# Patient Record
Sex: FEMALE
[De-identification: names, ages, dates, MRNs, and addresses within clinical notes are randomized per-mention and may not be internally consistent; named-entity substitution may affect disease eponyms.]

---

## 2017-06-17 ENCOUNTER — HEALTH MAINTENANCE LETTER (OUTPATIENT)
Age: 52
End: 2017-06-17

## 2021-05-24 ENCOUNTER — RECORDS - HEALTHEAST (OUTPATIENT)
Dept: ADMINISTRATIVE | Facility: CLINIC | Age: 56
End: 2021-05-24

## 2021-05-25 ENCOUNTER — RECORDS - HEALTHEAST (OUTPATIENT)
Dept: ADMINISTRATIVE | Facility: CLINIC | Age: 56
End: 2021-05-25

## 2021-05-27 ENCOUNTER — RECORDS - HEALTHEAST (OUTPATIENT)
Dept: ADMINISTRATIVE | Facility: CLINIC | Age: 56
End: 2021-05-27

## 2021-05-28 ENCOUNTER — RECORDS - HEALTHEAST (OUTPATIENT)
Dept: ADMINISTRATIVE | Facility: CLINIC | Age: 56
End: 2021-05-28

## 2021-05-29 ENCOUNTER — RECORDS - HEALTHEAST (OUTPATIENT)
Dept: ADMINISTRATIVE | Facility: CLINIC | Age: 56
End: 2021-05-29

## 2021-06-01 ENCOUNTER — RECORDS - HEALTHEAST (OUTPATIENT)
Dept: ADMINISTRATIVE | Facility: CLINIC | Age: 56
End: 2021-06-01

## 2023-04-06 ENCOUNTER — TRANSFERRED RECORDS (OUTPATIENT)
Dept: HEALTH INFORMATION MANAGEMENT | Facility: CLINIC | Age: 58
End: 2023-04-06

## 2024-02-29 ENCOUNTER — TRANSFERRED RECORDS (OUTPATIENT)
Dept: HEALTH INFORMATION MANAGEMENT | Facility: CLINIC | Age: 59
End: 2024-02-29

## 2024-03-04 ENCOUNTER — TRANSFERRED RECORDS (OUTPATIENT)
Dept: HEALTH INFORMATION MANAGEMENT | Facility: CLINIC | Age: 59
End: 2024-03-04
Payer: COMMERCIAL

## 2024-03-04 LAB
ALT SERPL-CCNC: 15 U/L (ref 6–29)
AST SERPL-CCNC: 11 U/L (ref 10–35)
CHOLESTEROL (EXTERNAL): 242 MG/DL
CREATININE (EXTERNAL): 0.61 MG/DL (ref 0.5–1.03)
GFR ESTIMATED (EXTERNAL): 103 ML/MIN/1.73M2
GLUCOSE (EXTERNAL): 128 MG/DL (ref 65–99)
HBA1C MFR BLD: 7.7 %
HDLC SERPL-MCNC: 57 MG/DL
LDL CHOLESTEROL CALCULATED (EXTERNAL): 149 MG/DL
NON HDL CHOLESTEROL (EXTERNAL): 185 MG/DL
POTASSIUM (EXTERNAL): 4 MMOL/L (ref 3.5–5.3)
TRIGLYCERIDES (EXTERNAL): 222 MG/DL
TSH SERPL-ACNC: 2.2 MIU/L (ref 0.4–4.5)

## 2024-03-06 ENCOUNTER — TRANSFERRED RECORDS (OUTPATIENT)
Dept: HEALTH INFORMATION MANAGEMENT | Facility: CLINIC | Age: 59
End: 2024-03-06
Payer: COMMERCIAL

## 2024-03-07 ENCOUNTER — TRANSCRIBE ORDERS (OUTPATIENT)
Dept: OTHER | Age: 59
End: 2024-03-07

## 2024-03-07 DIAGNOSIS — R07.89 CHEST DISCOMFORT: Primary | ICD-10-CM

## 2024-03-08 ENCOUNTER — APPOINTMENT (OUTPATIENT)
Dept: INTERPRETER SERVICES | Facility: CLINIC | Age: 59
End: 2024-03-08
Payer: COMMERCIAL

## 2024-03-08 ENCOUNTER — TRANSFERRED RECORDS (OUTPATIENT)
Dept: HEALTH INFORMATION MANAGEMENT | Facility: CLINIC | Age: 59
End: 2024-03-08
Payer: COMMERCIAL

## 2024-04-03 ENCOUNTER — OFFICE VISIT (OUTPATIENT)
Dept: CARDIOLOGY | Facility: CLINIC | Age: 59
End: 2024-04-03
Payer: COMMERCIAL

## 2024-04-03 VITALS
RESPIRATION RATE: 14 BRPM | BODY MASS INDEX: 33.08 KG/M2 | DIASTOLIC BLOOD PRESSURE: 82 MMHG | HEART RATE: 68 BPM | SYSTOLIC BLOOD PRESSURE: 122 MMHG | WEIGHT: 161 LBS

## 2024-04-03 DIAGNOSIS — E78.5 DYSLIPIDEMIA: ICD-10-CM

## 2024-04-03 DIAGNOSIS — R07.89 CHEST DISCOMFORT: Primary | ICD-10-CM

## 2024-04-03 DIAGNOSIS — R00.2 PALPITATIONS: ICD-10-CM

## 2024-04-03 DIAGNOSIS — E11.9 TYPE 2 DIABETES MELLITUS WITHOUT COMPLICATION, WITHOUT LONG-TERM CURRENT USE OF INSULIN (H): ICD-10-CM

## 2024-04-03 LAB
ATRIAL RATE - MUSE: 67 BPM
DIASTOLIC BLOOD PRESSURE - MUSE: NORMAL MMHG
INTERPRETATION ECG - MUSE: NORMAL
P AXIS - MUSE: 53 DEGREES
PR INTERVAL - MUSE: 166 MS
QRS DURATION - MUSE: 82 MS
QT - MUSE: 422 MS
QTC - MUSE: 445 MS
R AXIS - MUSE: 10 DEGREES
SYSTOLIC BLOOD PRESSURE - MUSE: NORMAL MMHG
T AXIS - MUSE: 35 DEGREES
VENTRICULAR RATE- MUSE: 67 BPM

## 2024-04-03 PROCEDURE — 93000 ELECTROCARDIOGRAM COMPLETE: CPT | Performed by: INTERNAL MEDICINE

## 2024-04-03 PROCEDURE — 93242 EXT ECG>48HR<7D RECORDING: CPT | Performed by: INTERNAL MEDICINE

## 2024-04-03 PROCEDURE — 99204 OFFICE O/P NEW MOD 45 MIN: CPT | Performed by: INTERNAL MEDICINE

## 2024-04-03 RX ORDER — METFORMIN HCL 500 MG
500 TABLET, EXTENDED RELEASE 24 HR ORAL 2 TIMES DAILY WITH MEALS
COMMUNITY
Start: 2024-03-01

## 2024-04-03 NOTE — LETTER
4/3/2024    Dakota Fernández MD  [address]  [facility] [address]    RE: Miguel Fernández       Dear Colleague,     I had the pleasure of seeing [facility] in the [facility].      Click to link to [facility] HEART CARE NOTE    Thank you, Dr. Fernández, for asking me to see Miguel Fernández in consultation at [facility] to evaluate chest discomfort and palpitations.      Assessment/Plan:   Chest discomfort: The patient developed intermittent chest discomfort over last 2 months.  It is associated with shortness of breath on exertion.  ECG showed sinus arrhythmia with no ischemic or ST-T change.  We discussed the further evaluation and management.  She has several risk of developing significant coronary artery disease including type 2 diabetes, dyslipidemia, her age.  After discussion, coronary CT angiogram is requested for further evaluation.  Palpitations: The patient complains of intermittent palpitations over last 1 to 2 months.  She described her palpitations as fluttering heartbeats, sometimes rapid heartbeats.  It could be related to premature heartbeats such as PVCs, short episodes of atrial fibrillation or paroxysmal supraventricular tachycardia.  After discussion, 3 days of Zio patch monitor is requested.  Dyslipidemia: Continue Zocor 40 mg at bedtime.  Her recent LDL on March 4, 2024 was 149.  She has type 2 diabetes.  LDL is not controlled.  After coronary CT angiogram exam, modify her statins at next visit.  Type 2 diabetes: Continue metformin.    We will follow-up her Zio patch monitor, coronary CT angiogram reports, discuss the findings with the patient.    The patient does not speak English.  Her medical history is interpreted by a professional [facility] .      Thank you for the opportunity to be involved in the care of Miguel Fernández. If you have any questions, please feel free to contact me.  I will see the patient again in 2 months and  as needed    Much or all of the text in this note was generated through the use of Dragon Dictate voice-to-text software. Errors in spelling or words which seem out of context are unintentional.   Sound alike errors, in particular, may have escaped editing.       History of Present Illness:   It is my pleasure to see Miguel Fernández at the [facility] clinic for evaluation of chest discomfort and palpitations. Miguel Fernández is a 58 year old female with a medical history of type 2 diabetes, dyslipidemia, anxiety, hepatitis B viral carrier.    The patient developed intermittent chest discomfort and palpitations over last 2 months.  She described her chest discomfort as located anterior chest, mild in severity, intermittent, lasted variable duration, no radiation, not typically associated with exertion.  She also complains of shortness of breath on exertion at a similar time.  She complains of intermittent fluttering heartbeats which also make her shortness of breath.  She denies lightheadedness, dizziness, orthopnea, PND or leg edema.  Her weight has been stable.  Her blood pressure and heart rate are in normal range today.    Twelve-lead ECG in clinic today showed normal sinus rhythm, no ischemic or ST-T change.    Past Medical History:     Patient Active Problem List   Diagnosis    Immunology Studies Nonspecific Abnormal Findings    Anxiety    Myalgia And Myositis    Chronic Pain Syndrome    Arthralgias In Multiple Sites    Lower Back Pain    Cough    Major Depression, Single Episode    Hyperlipidemia    Tingling (Paresthesia)    Abdominal Pain    Hepatitis, B Virus [place]    Headache       Past Surgical History:   No past surgical history on file.    Family History:   Reviewed: Denies family history of coronary artery disease or CHF, sudden cardiac death.    Social History:    reports that she has never smoked. She does not have any smokeless tobacco history on file. She reports that she does not drink  alcohol and does not use drugs.    Review of Systems:   12 systems are reviewed negative except for in HPI.    Meds:     Current Outpatient Medications:     acetaminophen-codeine (TYLENOL #3) 300-30 mg per tablet, [ACETAMINOPHEN-CODEINE (TYLENOL #3) 300-30 MG PER TABLET] Take 1-2 tablets by mouth every 6 (six) hours as needed for pain., Disp: , Rfl:     ALPRAZolam (XANAX) 0.5 MG tablet, [ALPRAZOLAM (XANAX) 0.5 MG TABLET] Take 0.5 mg by mouth every 8 (eight) hours as needed (for anxiety attacks)., Disp: , Rfl:     buPROPion (WELLBUTRIN XL) 300 MG 24 hr tablet, [BUPROPION (WELLBUTRIN XL) 300 MG 24 HR TABLET] , Disp: , Rfl:     CALCIUM 600 + D,3, 600 mg(1,500mg) -200 unit per tablet, [CALCIUM 600 + D,3, 600 MG(1,500MG) -200 UNIT PER TABLET] TAKE 1 PILL BY MOUTH EVERY DAY/[facility] NOJ 1 LUB TSHUAJ PAB POBTXHA, Disp: 30 tablet, Rfl: 10    capsaicin (TRIXAICIN) 0.025 % cream, [CAPSAICIN (TRIXAICIN) 0.025 % CREAM] , Disp: , Rfl:     capsicum (TRIXAICIN HP) 0.075 % topical cream, [CAPSICUM (TRIXAICIN HP) 0.075 % TOPICAL CREAM] , Disp: , Rfl:     cholecalciferol, vitamin D3, (VITAMIN D3) 1,000 unit capsule, [CHOLECALCIFEROL, VITAMIN D3, (VITAMIN D3) 1,000 UNIT CAPSULE] Take 1,000 Units by mouth daily., Disp: , Rfl:     cyclobenzaprine (FLEXERIL) 10 MG tablet, [CYCLOBENZAPRINE (FLEXERIL) 10 MG TABLET] Take 10 mg by mouth 3 (three) times a day as needed (for muscle tension, tightness)., Disp: , Rfl:     DULoxetine (CYMBALTA) 60 MG capsule, [DULOXETINE (CYMBALTA) 60 MG CAPSULE] Take 60 mg by mouth daily., Disp: , Rfl:     lansoprazole (PREVACID) 30 MG capsule, [LANSOPRAZOLE (PREVACID) 30 MG CAPSULE] TAKE 1 PILL BY MOUTH EVERY DAY/ TXHUA HNUB NOJ 1 LUB TSHUAJ PAB CRYSTAL LUB PLAB, Disp: 30 capsule, Rfl: 5    lidocaine (XYLOCAINE) 5 % ointment, [LIDOCAINE (XYLOCAINE) 5 % OINTMENT] , Disp: , Rfl:     loperamide (IMODIUM A-D) 2 mg tablet, [LOPERAMIDE (IMODIUM A-D) 2 MG TABLET] Take 4 mg by mouth bedtime., Disp: , Rfl:     loratadine  (CLARITIN) 10 mg tablet, [LORATADINE (CLARITIN) 10 MG TABLET] Take 10 mg by mouth daily., Disp: , Rfl:     meclizine (ANTIVERT) 25 mg tablet, [MECLIZINE (ANTIVERT) 25 MG TABLET] Take 25 mg by mouth 4 (four) times a day., Disp: , Rfl:     metFORMIN (GLUCOPHAGE XR) 500 MG 24 hr tablet, Take 500 mg by mouth 2 times daily (with meals), Disp: , Rfl:     simvastatin (ZOCOR) 40 MG tablet, [SIMVASTATIN (ZOCOR) 40 MG TABLET] TAKE 1 PILL BY MOUTH EVERY DAY/ TXHUA HNUB NOJ 1 LUB TSHUAJ PAB CRYSTAL NTSHAV MUAJ ROJ, Disp: 30 tablet, Rfl: 0    traZODone (DESYREL) 100 MG tablet, [TRAZODONE (DESYREL) 100 MG TABLET] Take 200 mg by mouth bedtime., Disp: , Rfl:     triamcinolone (KENALOG) 0.1 % paste, [TRIAMCINOLONE (KENALOG) 0.1 % PASTE] Apply to teeth 2 (two) times a day., Disp: , Rfl:     trolamine salicylate (ARTHRICREAM) 10 % cream, [TROLAMINE SALICYLATE (ARTHRICREAM) 10 % CREAM] , Disp: , Rfl:      Allergies:   Patient has no known allergies.    Objective:      Physical Exam  73 kg (161 lb)     Body mass index is 33.08 kg/m .  /82 (BP Location: Left arm, Patient Position: Sitting, Cuff Size: Adult Regular)   Pulse 68   Resp 14   Wt 73 kg (161 lb)   BMI 33.08 kg/m      General Appearance:   Awake, Alert, No acute distress.   HEENT:  Pupil equal, reactive to light. No scleral icterus; the mucous membranes were moist. No oral ulcers or thrush.    Neck: No cervical bruits. No JVD. No thyromegaly. No lymph node enlargement or tenderness.   Chest: The spine was straight. The chest was symmetric.   Lungs:   Respirations unlabored. Lungs are clear to auscultation. No crackles. No wheezing.   Cardiovascular:   RRR, normal first and second heart sounds with no murmurs. No rubs or gallops.    Abdomen:  Soft. No tenderness. Non-distended. Bowels sounds are present   Extremities: Equal posterior tibial pulses. No leg edema.   Skin: No rashes or ulcers. Warm, Dry.   Musculoskeletal: No tenderness. No deformity.   Neurologic: Mood and  affect are appropriate. No focal deficits.         EKG:  Personally reivewed  Normal sinus rhythm  Normal ECG  No previous ECG for comparison    Cardiac Imaging Studies       Lab Review   All her laboratory test on March 4, 2024 are reviewed:  CBC and BMP are normal except for blood sugar slightly high 128.   even she is on Zocor 40 mg at bedtime              Thank you for allowing me to participate in the care of your patient.      Sincerely,     Yaya Pinon MD     [facility]  cc:   Dakota Fernández MD  [facility] [place] [facility] [address] [place] [address]

## 2024-04-03 NOTE — PROGRESS NOTES
Click to link to [facility] HEART CARE NOTE    Thank you, Dr. Fernández, for asking me to see Miguel Fernández in consultation at [facility] to evaluate chest discomfort and palpitations.      Assessment/Plan:   Chest discomfort: The patient developed intermittent chest discomfort over last 2 months.  It is associated with shortness of breath on exertion.  ECG showed sinus arrhythmia with no ischemic or ST-T change.  We discussed the further evaluation and management.  She has several risk of developing significant coronary artery disease including type 2 diabetes, dyslipidemia, her age.  After discussion, coronary CT angiogram is requested for further evaluation.  Palpitations: The patient complains of intermittent palpitations over last 1 to 2 months.  She described her palpitations as fluttering heartbeats, sometimes rapid heartbeats.  It could be related to premature heartbeats such as PVCs, short episodes of atrial fibrillation or paroxysmal supraventricular tachycardia.  After discussion, 3 days of Zio patch monitor is requested.  Dyslipidemia: Continue Zocor 40 mg at bedtime.  Her recent LDL on March 4, 2024 was 149.  She has type 2 diabetes.  LDL is not controlled.  After coronary CT angiogram exam, modify her statins at next visit.  Type 2 diabetes: Continue metformin.    We will follow-up her Zio patch monitor, coronary CT angiogram reports, discuss the findings with the patient.    The patient does not speak English.  Her medical history is interpreted by a professional [facility] .      Thank you for the opportunity to be involved in the care of Miguel Fernández. If you have any questions, please feel free to contact me.  I will see the patient again in 2 months and as needed    Much or all of the text in this note was generated through the use of Dragon Dictate voice-to-text software. Errors in spelling or words which seem out of context are unintentional.   Sound  alike errors, in particular, may have escaped editing.       History of Present Illness:   It is my pleasure to see Miguel Fernández at the [facility] clinic for evaluation of chest discomfort and palpitations. Miguel Fernández is a 58 year old female with a medical history of type 2 diabetes, dyslipidemia, anxiety, hepatitis B viral carrier.    The patient developed intermittent chest discomfort and palpitations over last 2 months.  She described her chest discomfort as located anterior chest, mild in severity, intermittent, lasted variable duration, no radiation, not typically associated with exertion.  She also complains of shortness of breath on exertion at a similar time.  She complains of intermittent fluttering heartbeats which also make her shortness of breath.  She denies lightheadedness, dizziness, orthopnea, PND or leg edema.  Her weight has been stable.  Her blood pressure and heart rate are in normal range today.    Twelve-lead ECG in clinic today showed normal sinus rhythm, no ischemic or ST-T change.    Past Medical History:     Patient Active Problem List   Diagnosis    Immunology Studies Nonspecific Abnormal Findings    Anxiety    Myalgia And Myositis    Chronic Pain Syndrome    Arthralgias In Multiple Sites    Lower Back Pain    Cough    Major Depression, Single Episode    Hyperlipidemia    Tingling (Paresthesia)    Abdominal Pain    Hepatitis, B Virus [place]    Headache       Past Surgical History:   No past surgical history on file.    Family History:   Reviewed: Denies family history of coronary artery disease or CHF, sudden cardiac death.    Social History:    reports that she has never smoked. She does not have any smokeless tobacco history on file. She reports that she does not drink alcohol and does not use drugs.    Review of Systems:   12 systems are reviewed negative except for in HPI.    Meds:     Current Outpatient Medications:     acetaminophen-codeine (TYLENOL #3) 300-30 mg  per tablet, [ACETAMINOPHEN-CODEINE (TYLENOL #3) 300-30 MG PER TABLET] Take 1-2 tablets by mouth every 6 (six) hours as needed for pain., Disp: , Rfl:     ALPRAZolam (XANAX) 0.5 MG tablet, [ALPRAZOLAM (XANAX) 0.5 MG TABLET] Take 0.5 mg by mouth every 8 (eight) hours as needed (for anxiety attacks)., Disp: , Rfl:     buPROPion (WELLBUTRIN XL) 300 MG 24 hr tablet, [BUPROPION (WELLBUTRIN XL) 300 MG 24 HR TABLET] , Disp: , Rfl:     CALCIUM 600 + D,3, 600 mg(1,500mg) -200 unit per tablet, [CALCIUM 600 + D,3, 600 MG(1,500MG) -200 UNIT PER TABLET] TAKE 1 PILL BY MOUTH EVERY DAY/[facility] NO 1 LUB [facility] POBTXHA, Disp: 30 tablet, Rfl: 10    capsaicin (TRIXAICIN) 0.025 % cream, [CAPSAICIN (TRIXAICIN) 0.025 % CREAM] , Disp: , Rfl:     capsicum (TRIXAICIN HP) 0.075 % topical cream, [CAPSICUM (TRIXAICIN HP) 0.075 % TOPICAL CREAM] , Disp: , Rfl:     cholecalciferol, vitamin D3, (VITAMIN D3) 1,000 unit capsule, [CHOLECALCIFEROL, VITAMIN D3, (VITAMIN D3) 1,000 UNIT CAPSULE] Take 1,000 Units by mouth daily., Disp: , Rfl:     cyclobenzaprine (FLEXERIL) 10 MG tablet, [CYCLOBENZAPRINE (FLEXERIL) 10 MG TABLET] Take 10 mg by mouth 3 (three) times a day as needed (for muscle tension, tightness)., Disp: , Rfl:     DULoxetine (CYMBALTA) 60 MG capsule, [DULOXETINE (CYMBALTA) 60 MG CAPSULE] Take 60 mg by mouth daily., Disp: , Rfl:     lansoprazole (PREVACID) 30 MG capsule, [LANSOPRAZOLE (PREVACID) 30 MG CAPSULE] TAKE 1 P[facility] BY MOUTH EVERY [facility] LUB [facility] PAB CRYSTAL LUB PLAB, Disp: 30 capsule, Rfl: 5    lidocaine (XYLOCAINE) 5 % ointment, [LIDOCAINE (XYLOCAINE) 5 % OINTMENT] , Disp: , Rfl:     loperamide (IMODIUM A-D) 2 mg tablet, [LOPERAMIDE (IMODIUM A-D) 2 MG TABLET] Take 4 mg by mouth bedtime., Disp: , Rfl:     loratadine (CLARITIN) 10 mg tablet, [LORATADINE (CLARITIN) 10 MG TABLET] Take 10 mg by mouth daily., Disp: , Rfl:     meclizine (ANTIVERT) 25 mg tablet, [MECLIZINE (ANTIVERT) 25 MG TABLET] Take 25 mg by mouth 4  (four) times a day., Disp: , Rfl:     metFORMIN (GLUCOPHAGE XR) 500 MG 24 hr tablet, Take 500 mg by mouth 2 times daily (with meals), Disp: , Rfl:     simvastatin (ZOCOR) 40 MG tablet, [SIMVASTATIN (ZOCOR) 40 MG TABLET] TAKE 1 PILL BY MOUTH EVERY DAY/ TXJUANYA HNUB NOJ 1 LUB TSHUAJ PAB CRYSTAL NTSHAV MUAJ ROJ, Disp: 30 tablet, Rfl: 0    traZODone (DESYREL) 100 MG tablet, [TRAZODONE (DESYREL) 100 MG TABLET] Take 200 mg by mouth bedtime., Disp: , Rfl:     triamcinolone (KENALOG) 0.1 % paste, [TRIAMCINOLONE (KENALOG) 0.1 % PASTE] Apply to teeth 2 (two) times a day., Disp: , Rfl:     trolamine salicylate (ARTHRICREAM) 10 % cream, [TROLAMINE SALICYLATE (ARTHRICREAM) 10 % CREAM] , Disp: , Rfl:      Allergies:   Patient has no known allergies.    Objective:      Physical Exam  73 kg (161 lb)     Body mass index is 33.08 kg/m .  /82 (BP Location: Left arm, Patient Position: Sitting, Cuff Size: Adult Regular)   Pulse 68   Resp 14   Wt 73 kg (161 lb)   BMI 33.08 kg/m      General Appearance:   Awake, Alert, No acute distress.   HEENT:  Pupil equal, reactive to light. No scleral icterus; the mucous membranes were moist. No oral ulcers or thrush.    Neck: No cervical bruits. No JVD. No thyromegaly. No lymph node enlargement or tenderness.   Chest: The spine was straight. The chest was symmetric.   Lungs:   Respirations unlabored. Lungs are clear to auscultation. No crackles. No wheezing.   Cardiovascular:   RRR, normal first and second heart sounds with no murmurs. No rubs or gallops.    Abdomen:  Soft. No tenderness. Non-distended. Bowels sounds are present   Extremities: Equal posterior tibial pulses. No leg edema.   Skin: No rashes or ulcers. Warm, Dry.   Musculoskeletal: No tenderness. No deformity.   Neurologic: Mood and affect are appropriate. No focal deficits.         EKG:  Personally reivewed  Normal sinus rhythm  Normal ECG  No previous ECG for comparison    Cardiac Imaging Studies       Lab Review   All her  laboratory test on March 4, 2024 are reviewed:  CBC and BMP are normal except for blood sugar slightly high 128.   even she is on Zocor 40 mg at bedtime

## 2024-04-03 NOTE — NURSING NOTE
Miguel Fernández arrived here on 4/3/2024 8:53 AM for 3-7 Days  Zio monitor placement per ordering provider Dr. Pinon for the diagnosis Palpitations [R00.2] .  Patient s skin was prepped per protocol.  Zio monitor was placed.  Instructions were reviewed with and given to the patient.  Patient verbalized understanding of wear, troubleshooting and monitor return instructions.

## 2024-04-17 PROCEDURE — 93244 EXT ECG>48HR<7D REV&INTERPJ: CPT | Performed by: INTERNAL MEDICINE

## 2024-05-01 ENCOUNTER — HOSPITAL ENCOUNTER (OUTPATIENT)
Dept: CT IMAGING | Facility: CLINIC | Age: 59
Discharge: HOME | End: 2024-05-01
Attending: INTERNAL MEDICINE | Admitting: INTERNAL MEDICINE
Payer: COMMERCIAL

## 2024-05-01 VITALS — SYSTOLIC BLOOD PRESSURE: 118 MMHG | HEART RATE: 67 BPM | DIASTOLIC BLOOD PRESSURE: 59 MMHG

## 2024-05-01 DIAGNOSIS — R07.89 CHEST DISCOMFORT: ICD-10-CM

## 2024-05-01 LAB
BSA FOR ECHO PROCEDURE: 0 M2
CREAT BLD-MCNC: 0.6 MG/DL (ref 0.6–1.1)
CV CALCIUM SCORE AGATSTON LM: 0
CV CALCIUM SCORING AGATSON LAD: 26
CV CALCIUM SCORING AGATSTON CX: 1
CV CALCIUM SCORING AGATSTON RCA: 1
CV CALCIUM SCORING AGATSTON TOTAL: 28
EGFRCR SERPLBLD CKD-EPI 2021: >60 ML/MIN/1.73M2

## 2024-05-01 PROCEDURE — 75574 CT ANGIO HRT W/3D IMAGE: CPT | Mod: 26 | Performed by: INTERNAL MEDICINE

## 2024-05-01 PROCEDURE — 82565 ASSAY OF CREATININE: CPT

## 2024-05-01 PROCEDURE — 75574 CT ANGIO HRT W/3D IMAGE: CPT

## 2024-05-01 PROCEDURE — 250N000013 HC RX MED GY IP 250 OP 250 PS 637: Performed by: INTERNAL MEDICINE

## 2024-05-01 PROCEDURE — 250N000011 HC RX IP 250 OP 636: Performed by: INTERNAL MEDICINE

## 2024-05-01 RX ORDER — IOPAMIDOL 755 MG/ML
100 INJECTION, SOLUTION INTRAVASCULAR ONCE
Status: COMPLETED | OUTPATIENT
Start: 2024-05-01 | End: 2024-05-01

## 2024-05-01 RX ORDER — DILTIAZEM HYDROCHLORIDE 5 MG/ML
10 INJECTION INTRAVENOUS
Status: DISCONTINUED | OUTPATIENT
Start: 2024-05-01 | End: 2024-05-02 | Stop reason: HOSPADM

## 2024-05-01 RX ORDER — NITROGLYCERIN 0.4 MG/1
0.4 TABLET SUBLINGUAL ONCE
Status: COMPLETED | OUTPATIENT
Start: 2024-05-01 | End: 2024-05-01

## 2024-05-01 RX ORDER — METOPROLOL TARTRATE 1 MG/ML
5 INJECTION, SOLUTION INTRAVENOUS
Status: DISCONTINUED | OUTPATIENT
Start: 2024-05-01 | End: 2024-05-02 | Stop reason: HOSPADM

## 2024-05-01 RX ORDER — DILTIAZEM HYDROCHLORIDE 5 MG/ML
5 INJECTION INTRAVENOUS
Status: DISCONTINUED | OUTPATIENT
Start: 2024-05-01 | End: 2024-05-02 | Stop reason: HOSPADM

## 2024-05-01 RX ADMIN — IOPAMIDOL 100 ML: 755 INJECTION, SOLUTION INTRAVENOUS at 08:13

## 2024-05-01 RX ADMIN — NITROGLYCERIN 0.4 MG: 0.4 TABLET SUBLINGUAL at 08:12

## 2024-06-03 ENCOUNTER — OFFICE VISIT (OUTPATIENT)
Dept: CARDIOLOGY | Facility: CLINIC | Age: 59
End: 2024-06-03
Attending: INTERNAL MEDICINE
Payer: COMMERCIAL

## 2024-06-03 VITALS
SYSTOLIC BLOOD PRESSURE: 122 MMHG | OXYGEN SATURATION: 96 % | WEIGHT: 157 LBS | RESPIRATION RATE: 16 BRPM | BODY MASS INDEX: 32.25 KG/M2 | HEART RATE: 68 BPM | DIASTOLIC BLOOD PRESSURE: 82 MMHG

## 2024-06-03 DIAGNOSIS — E78.5 DYSLIPIDEMIA, GOAL LDL BELOW 100: ICD-10-CM

## 2024-06-03 DIAGNOSIS — R07.89 CHEST DISCOMFORT: Primary | ICD-10-CM

## 2024-06-03 DIAGNOSIS — I25.10 ATHEROSCLEROSIS OF NATIVE CORONARY ARTERY OF NATIVE HEART WITHOUT ANGINA PECTORIS: ICD-10-CM

## 2024-06-03 DIAGNOSIS — R00.2 PALPITATIONS: ICD-10-CM

## 2024-06-03 DIAGNOSIS — E11.9 TYPE 2 DIABETES MELLITUS WITHOUT COMPLICATION, WITHOUT LONG-TERM CURRENT USE OF INSULIN (H): ICD-10-CM

## 2024-06-03 PROCEDURE — 99214 OFFICE O/P EST MOD 30 MIN: CPT | Performed by: INTERNAL MEDICINE

## 2024-06-03 RX ORDER — ROSUVASTATIN CALCIUM 40 MG/1
40 TABLET, COATED ORAL DAILY
Qty: 30 TABLET | Refills: 11 | Status: SHIPPED | OUTPATIENT
Start: 2024-06-03

## 2024-06-03 NOTE — PROGRESS NOTES
Click to link to [facility] HEART CARE NOTE       Assessment/Plan:   Coronary atherosclerosis: Her intermittent chest discomfort is not a cardiac etiology since her coronary CT angiogram was reported minimal coronary atherosclerosis.  Her symptoms could be related to anterior chest muscle strain.  We discussed the lifestyle modification including diet control, regular exercise and weight loss.  Discussed risk factor control including diabetes and dyslipidemia.   Palpitations: The patient's palpitations were correlated to sinus rhythm.  No cardiac arrhythmia.  Most likely she could have some of premature heartbeats.  No indication of medical treatment at this time.  Continue lifestyle modification and monitor.    Dyslipidemia:  Her LDL level was 149 even she was on simvastatin 40 mg at bedtime.  Simvastatin was discontinued, started rosuvastatin 40 mg at bedtime.  Repeat a lipid profile and liver function in 3 months.    Type 2 diabetes: Continue metformin.    The patient does not speak English.  Her medical history is interpreted by a professional [employer] .    Thank you for the opportunity to be involved in the care of Miguel Fernández. If you have any questions, please feel free to contact me.  I will see the patient again in 12 months and as needed    Much or all of the text in this note was generated through the use of Dragon Dictate voice-to-text software. Errors in spelling or words which seem out of context are unintentional.   Sound alike errors, in particular, may have escaped editing.       History of Present Illness:   It is my pleasure to see Miguel Fernández at the [facility] clinic for routine cardiology follow-up and discussing coronary CT angiogram and Zio patch monitor reports. Miguel Fernández is a 58 year old female with a medical history of type 2 diabetes, dyslipidemia, anxiety, hepatitis B viral carrier.    The patient was evaluated for  intermittent chest  discomfort and palpitations over last 2 months.  Since last visit, she still has some chest discomfort, not exertional.  She has occasional palpitations.  There is no significant interval change over last 2 months.  She had no chest pain.  She has no shortness of breath on exertion, no dizziness, orthopnea, PND or leg edema.  Her blood pressure and heart rate are in normal range.         Her coronary CT angiogram was reported total coronary calcium score 28, minimal coronary atherosclerosis in LAD, no obstructive lesion.  Her Zio patch monitor essentially was normal, symptoms of palpitations correlated to sinus rhythm.    Past Medical History:     Patient Active Problem List   Diagnosis    Immunology Studies Nonspecific Abnormal Findings    Anxiety    Myalgia And Myositis    Chronic Pain Syndrome    Arthralgias In Multiple Sites    Lower Back Pain    Cough    Major Depression, Single Episode    Dyslipidemia    Tingling (Paresthesia)    Abdominal Pain    Hepatitis, B Virus [place]    Headache    Type 2 diabetes mellitus without complication, without long-term current use of insulin (H)       Past Surgical History:   No past surgical history on file.    Family History:   Reviewed: Denies family history of coronary artery disease or CHF, sudden cardiac death.    Social History:    reports that she has never smoked. She does not have any smokeless tobacco history on file. She reports that she does not drink alcohol and does not use drugs.    Review of Systems:   12 systems are reviewed negative except for in HPI.    Meds:     Current Outpatient Medications:     acetaminophen-codeine (TYLENOL #3) 300-30 mg per tablet, [ACETAMINOPHEN-CODEINE (TYLENOL #3) 300-30 MG PER TABLET] Take 1-2 tablets by mouth every 6 (six) hours as needed for pain., Disp: , Rfl:     ALPRAZolam (XANAX) 0.5 MG tablet, [ALPRAZOLAM (XANAX) 0.5 MG TABLET] Take 0.5 mg by mouth every 8 (eight) hours as needed (for anxiety attacks)., Disp: , Rfl:      buPROPion (WELLBUTRIN XL) 300 MG 24 hr tablet, [BUPROPION (WELLBUTRIN XL) 300 MG 24 HR TABLET] , Disp: , Rfl:     CALCIUM 600 + D,3, 600 mg(1,500mg) -200 unit per tablet, [CALCIUM 600 + D,3, 600 MG(1,500MG) -200 UNIT PER TABLET] TAKE 1 PILL BY MOUTH EVERY DAY/[facility] POBTXHA, Disp: 30 tablet, Rfl: 10    capsaicin (TRIXAICIN) 0.025 % cream, [CAPSAICIN (TRIXAICIN) 0.025 % CREAM] , Disp: , Rfl:     capsicum (TRIXAICIN HP) 0.075 % topical cream, [CAPSICUM (TRIXAICIN HP) 0.075 % TOPICAL CREAM] , Disp: , Rfl:     cholecalciferol, vitamin D3, (VITAMIN D3) 1,000 unit capsule, [CHOLECALCIFEROL, VITAMIN D3, (VITAMIN D3) 1,000 UNIT CAPSULE] Take 1,000 Units by mouth daily., Disp: , Rfl:     cyclobenzaprine (FLEXERIL) 10 MG tablet, [CYCLOBENZAPRINE (FLEXERIL) 10 MG TABLET] Take 10 mg by mouth 3 (three) times a day as needed (for muscle tension, tightness)., Disp: , Rfl:     DULoxetine (CYMBALTA) 60 MG capsule, [DULOXETINE (CYMBALTA) 60 MG CAPSULE] Take 60 mg by mouth daily., Disp: , Rfl:     lansoprazole (PREVACID) 30 MG capsule, [LANSOPRAZOLE (PREVACID) 30 MG CAPSULE] TAKE 1 PILL BY MOUTH EVERY DAY/ [facility] PLAB, Disp: 30 capsule, Rfl: 5    lidocaine (XYLOCAINE) 5 % ointment, [LIDOCAINE (XYLOCAINE) 5 % OINTMENT] , Disp: , Rfl:     loperamide (IMODIUM A-D) 2 mg tablet, [LOPERAMIDE (IMODIUM A-D) 2 MG TABLET] Take 4 mg by mouth bedtime., Disp: , Rfl:     loratadine (CLARITIN) 10 mg tablet, [LORATADINE (CLARITIN) 10 MG TABLET] Take 10 mg by mouth daily., Disp: , Rfl:     meclizine (ANTIVERT) 25 mg tablet, [MECLIZINE (ANTIVERT) 25 MG TABLET] Take 25 mg by mouth 4 (four) times a day., Disp: , Rfl:     metFORMIN (GLUCOPHAGE XR) 500 MG 24 hr tablet, Take 500 mg by mouth 2 times daily (with meals), Disp: , Rfl:     rosuvastatin (CRESTOR) 40 MG tablet, Take 1 tablet (40 mg) by mouth daily, Disp: 30 tablet, Rfl: 11    traZODone (DESYREL) 100 MG tablet, [TRAZODONE (DESYREL) 100  MG TABLET] Take 200 mg by mouth bedtime., Disp: , Rfl:     triamcinolone (KENALOG) 0.1 % paste, [TRIAMCINOLONE (KENALOG) 0.1 % PASTE] Apply to teeth 2 (two) times a day., Disp: , Rfl:     trolamine salicylate (ARTHRICREAM) 10 % cream, [TROLAMINE SALICYLATE (ARTHRICREAM) 10 % CREAM] , Disp: , Rfl:      Allergies:   Patient has no known allergies.    Objective:      Physical Exam  71.2 kg (157 lb)     Body mass index is 32.25 kg/m .  /82 (BP Location: Right arm, Patient Position: Sitting, Cuff Size: Adult Regular)   Pulse 68   Resp 16   Wt 71.2 kg (157 lb)   SpO2 96%   BMI 32.25 kg/m      General Appearance:   Awake, Alert, No acute distress.   HEENT:  Pupil equal, reactive to light. No scleral icterus; the mucous membranes were moist. No oral ulcers or thrush.    Neck: No cervical bruits. No JVD. No thyromegaly. No lymph node enlargement or tenderness.   Chest: The spine was straight. The chest was symmetric.   Lungs:   Respirations unlabored. Lungs are clear to auscultation. No crackles. No wheezing.   Cardiovascular:   RRR, normal first and second heart sounds with no murmurs. No rubs or gallops.    Abdomen:  Soft. No tenderness. Non-distended. Bowels sounds are present   Extremities: Equal posterior tibial pulses. No leg edema.   Skin: No rashes or ulcers. Warm, Dry.   Musculoskeletal: No tenderness. No deformity.   Neurologic: Mood and affect are appropriate. No focal deficits.         EKG:  Personally reivewed  Normal sinus rhythm  Normal ECG  No previous ECG for comparison    Cardiac Imaging Studies  Coronary CT angiogram on May 4, 2024:    Calcium Scoring: The total Agatston score is 28. A calcium score in this range places the individual in the 76th percentile when compared to an age and gender matched control group and implies a moderate risk of cardiac events in the next ten years.    Minimal coronary atherosclerosis with no obstructive plaque identified.    [employer] patch monitor on April 17,  2024  Zio monitoring from 4/3/2024 to 4/8/2024 (duration 5d 12 h).  Predominant underlying rhythm was sinus rhythm, 58 to 142 bpm, average 84 bpm.  No nonsustained or sustained tachyarrhythmias.  No atrial fibrillation.  There were no pauses of greater than 3 seconds.  Rare supraventricular ectopic beats (<1%).  No premature ventricular contractions (0%).  Symptom triggers correlated with sinus rhythm.    Lab Review   All her laboratory test on March 4, 2024 are reviewed:  CBC and BMP are normal except for blood sugar slightly high 128.   even she is on Zocor 40 mg at bedtime

## 2024-06-03 NOTE — LETTER
6/3/2024    Dakota Fernández MD  [facility] [address] [place] [address]    RE: Miguel Fernández       Dear Colleague,     I had the pleasure of seeing Miguel Fernández in the [facility].      Click to link to [facility] HEART CARE NOTE       Assessment/Plan:   Coronary atherosclerosis: Her intermittent chest discomfort is not a cardiac etiology since her coronary CT angiogram was reported minimal coronary atherosclerosis.  Her symptoms could be related to anterior chest muscle strain.  We discussed the lifestyle modification including diet control, regular exercise and weight loss.  Discussed risk factor control including diabetes and dyslipidemia.   Palpitations: The patient's palpitations were correlated to sinus rhythm.  No cardiac arrhythmia.  Most likely she could have some of premature heartbeats.  No indication of medical treatment at this time.  Continue lifestyle modification and monitor.    Dyslipidemia:  Her LDL level was 149 even she was on simvastatin 40 mg at bedtime.  Simvastatin was discontinued, started rosuvastatin 40 mg at bedtime.  Repeat a lipid profile and liver function in 3 months.    Type 2 diabetes: Continue metformin.    The patient does not speak English.  Her medical history is interpreted by a professional [facility] .    Thank you for the opportunity to be involved in the care of Miguel Fernández. If you have any questions, please feel free to contact me.  I will see the patient again in 12 months and as needed    Much or all of the text in this note was generated through the use of Dragon Dictate voice-to-text software. Errors in spelling or words which seem out of context are unintentional.   Sound alike errors, in particular, may have escaped editing.       History of Present Illness:   It is my pleasure to see Miguel Fernández at the [facility] for routine cardiology follow-up and discussing coronary CT angiogram and [employer]  patch monitor reports. Miguel Fernández is a 58 year old female with a medical history of type 2 diabetes, dyslipidemia, anxiety, hepatitis B viral carrier.    The patient was evaluated for  intermittent chest discomfort and palpitations over last 2 months.  Since last visit, she still has some chest discomfort, not exertional.  She has occasional palpitations.  There is no significant interval change over last 2 months.  She had no chest pain.  She has no shortness of breath on exertion, no dizziness, orthopnea, PND or leg edema.  Her blood pressure and heart rate are in normal range.         Her coronary CT angiogram was reported total coronary calcium score 28, minimal coronary atherosclerosis in LAD, no obstructive lesion.  Her Zio patch monitor essentially was normal, symptoms of palpitations correlated to sinus rhythm.    Past Medical History:     Patient Active Problem List   Diagnosis    Immunology Studies Nonspecific Abnormal Findings    Anxiety    Myalgia And Myositis    Chronic Pain Syndrome    Arthralgias In Multiple Sites    Lower Back Pain    Cough    Major Depression, Single Episode    Dyslipidemia    Tingling (Paresthesia)    Abdominal Pain    Hepatitis, B Virus [place]    Headache    Type 2 diabetes mellitus without complication, without long-term current use of insulin (H)       Past Surgical History:   No past surgical history on file.    Family History:   Reviewed: Denies family history of coronary artery disease or CHF, sudden cardiac death.    Social History:    reports that she has never smoked. She does not have any smokeless tobacco history on file. She reports that she does not drink alcohol and does not use drugs.    Review of Systems:   12 systems are reviewed negative except for in HPI.    Meds:     Current Outpatient Medications:     acetaminophen-codeine (TYLENOL #3) 300-30 mg per tablet, [ACETAMINOPHEN-CODEINE (TYLENOL #3) 300-30 MG PER TABLET] Take 1-2 tablets by mouth every 6 (six)  hours as needed for pain., Disp: , Rfl:     ALPRAZolam (XANAX) 0.5 MG tablet, [ALPRAZOLAM (XANAX) 0.5 MG TABLET] Take 0.5 mg by mouth every 8 (eight) hours as needed (for anxiety attacks)., Disp: , Rfl:     buPROPion (WELLBUTRIN XL) 300 MG 24 hr tablet, [BUPROPION (WELLBUTRIN XL) 300 MG 24 HR TABLET] , Disp: , Rfl:     CALCIUM 600 + D,3, 600 mg(1,500mg) -200 unit per tablet, [CALCIUM 600 + D,3, 600 MG(1,500MG) -200 UNIT PER TABLET] TAKE 1 P[facility] BY MOUTH EVERY DAY[facility] POBTXHA, Disp: 30 tablet, Rfl: 10    capsaicin (TRIXAICIN) 0.025 % cream, [CAPSAICIN (TRIXAICIN) 0.025 % CREAM] , Disp: , Rfl:     capsicum (TRIXAICIN HP) 0.075 % topical cream, [CAPSICUM (TRIXAICIN HP) 0.075 % TOPICAL CREAM] , Disp: , Rfl:     cholecalciferol, vitamin D3, (VITAMIN D3) 1,000 unit capsule, [CHOLECALCIFEROL, VITAMIN D3, (VITAMIN D3) 1,000 UNIT CAPSULE] Take 1,000 Units by mouth daily., Disp: , Rfl:     cyclobenzaprine (FLEXERIL) 10 MG tablet, [CYCLOBENZAPRINE (FLEXERIL) 10 MG TABLET] Take 10 mg by mouth 3 (three) times a day as needed (for muscle tension, tightness)., Disp: , Rfl:     DULoxetine (CYMBALTA) 60 MG capsule, [DULOXETINE (CYMBALTA) 60 MG CAPSULE] Take 60 mg by mouth daily., Disp: , Rfl:     lansoprazole (PREVACID) 30 MG capsule, [LANSOPRAZOLE (PREVACID) 30 MG CAPSULE] TAKE 1 PILL BY MOUTH EVERY DAY/ [facility] LUB [facility] CRYSTAL LUB PLAB, Disp: 30 capsule, Rfl: 5    lidocaine (XYLOCAINE) 5 % ointment, [LIDOCAINE (XYLOCAINE) 5 % OINTMENT] , Disp: , Rfl:     loperamide (IMODIUM A-D) 2 mg tablet, [LOPERAMIDE (IMODIUM A-D) 2 MG TABLET] Take 4 mg by mouth bedtime., Disp: , Rfl:     loratadine (CLARITIN) 10 mg tablet, [LORATADINE (CLARITIN) 10 MG TABLET] Take 10 mg by mouth daily., Disp: , Rfl:     meclizine (ANTIVERT) 25 mg tablet, [MECLIZINE (ANTIVERT) 25 MG TABLET] Take 25 mg by mouth 4 (four) times a day., Disp: , Rfl:     metFORMIN (GLUCOPHAGE XR) 500 MG 24 hr tablet, Take 500 mg by mouth 2  times daily (with meals), Disp: , Rfl:     rosuvastatin (CRESTOR) 40 MG tablet, Take 1 tablet (40 mg) by mouth daily, Disp: 30 tablet, Rfl: 11    traZODone (DESYREL) 100 MG tablet, [TRAZODONE (DESYREL) 100 MG TABLET] Take 200 mg by mouth bedtime., Disp: , Rfl:     triamcinolone (KENALOG) 0.1 % paste, [TRIAMCINOLONE (KENALOG) 0.1 % PASTE] Apply to teeth 2 (two) times a day., Disp: , Rfl:     trolamine salicylate (ARTHRICREAM) 10 % cream, [TROLAMINE SALICYLATE (ARTHRICREAM) 10 % CREAM] , Disp: , Rfl:      Allergies:   Patient has no known allergies.    Objective:      Physical Exam  71.2 kg (157 lb)     Body mass index is 32.25 kg/m .  /82 (BP Location: Right arm, Patient Position: Sitting, Cuff Size: Adult Regular)   Pulse 68   Resp 16   Wt 71.2 kg (157 lb)   SpO2 96%   BMI 32.25 kg/m      General Appearance:   Awake, Alert, No acute distress.   HEENT:  Pupil equal, reactive to light. No scleral icterus; the mucous membranes were moist. No oral ulcers or thrush.    Neck: No cervical bruits. No JVD. No thyromegaly. No lymph node enlargement or tenderness.   Chest: The spine was straight. The chest was symmetric.   Lungs:   Respirations unlabored. Lungs are clear to auscultation. No crackles. No wheezing.   Cardiovascular:   RRR, normal first and second heart sounds with no murmurs. No rubs or gallops.    Abdomen:  Soft. No tenderness. Non-distended. Bowels sounds are present   Extremities: Equal posterior tibial pulses. No leg edema.   Skin: No rashes or ulcers. Warm, Dry.   Musculoskeletal: No tenderness. No deformity.   Neurologic: Mood and affect are appropriate. No focal deficits.         EKG:  Personally reivewed  Normal sinus rhythm  Normal ECG  No previous ECG for comparison    Cardiac Imaging Studies  Coronary CT angiogram on May 4, 2024:    Calcium Scoring: The total Agatston score is 28. A calcium score in this range places the individual in the 76th percentile when compared to an age and gender  matched control group and implies a moderate risk of cardiac events in the next ten years.    Minimal coronary atherosclerosis with no obstructive plaque identified.    Zio patch monitor on April 17, 2024  Zio monitoring from 4/3/2024 to 4/8/2024 (duration 5d 12 h).  Predominant underlying rhythm was sinus rhythm, 58 to 142 bpm, average 84 bpm.  No nonsustained or sustained tachyarrhythmias.  No atrial fibrillation.  There were no pauses of greater than 3 seconds.  Rare supraventricular ectopic beats (<1%).  No premature ventricular contractions (0%).  Symptom triggers correlated with sinus rhythm.    Lab Review   All her laboratory test on March 4, 2024 are reviewed:  CBC and BMP are normal except for blood sugar slightly high 128.   even she is on Zocor 40 mg at bedtime              Thank you for allowing me to participate in the care of your patient.      Sincerely,     Yaya Pinon MD     [facility]  cc:   Yaya Pinon MD  [address]  [place] [address]